# Patient Record
Sex: MALE | Race: BLACK OR AFRICAN AMERICAN | NOT HISPANIC OR LATINO | URBAN - METROPOLITAN AREA
[De-identification: names, ages, dates, MRNs, and addresses within clinical notes are randomized per-mention and may not be internally consistent; named-entity substitution may affect disease eponyms.]

---

## 2017-10-06 ENCOUNTER — INPATIENT (INPATIENT)
Facility: HOSPITAL | Age: 22
LOS: 3 days | Discharge: HOME | End: 2017-10-10
Attending: PSYCHIATRY & NEUROLOGY | Admitting: PSYCHIATRY & NEUROLOGY
Payer: COMMERCIAL

## 2017-10-06 DIAGNOSIS — F32.9 MAJOR DEPRESSIVE DISORDER, SINGLE EPISODE, UNSPECIFIED: ICD-10-CM

## 2017-10-07 VITALS — HEIGHT: 75 IN | WEIGHT: 249.78 LBS | TEMPERATURE: 98 F

## 2017-10-07 RX ORDER — DIPHENHYDRAMINE HCL 50 MG
50 CAPSULE ORAL EVERY 6 HOURS
Qty: 0 | Refills: 0 | Status: DISCONTINUED | OUTPATIENT
Start: 2017-10-07 | End: 2017-10-10

## 2017-10-07 RX ORDER — RISPERIDONE 4 MG/1
2 TABLET ORAL AT BEDTIME
Qty: 0 | Refills: 0 | Status: DISCONTINUED | OUTPATIENT
Start: 2017-10-07 | End: 2017-10-09

## 2017-10-07 RX ORDER — BENZTROPINE MESYLATE 1 MG
1 TABLET ORAL AT BEDTIME
Qty: 0 | Refills: 0 | Status: DISCONTINUED | OUTPATIENT
Start: 2017-10-07 | End: 2017-10-10

## 2017-10-07 RX ORDER — NICOTINE POLACRILEX 2 MG
4 GUM BUCCAL
Qty: 0 | Refills: 0 | Status: DISCONTINUED | OUTPATIENT
Start: 2017-10-07 | End: 2017-10-10

## 2017-10-07 RX ORDER — HALOPERIDOL DECANOATE 100 MG/ML
5 INJECTION INTRAMUSCULAR ONCE
Qty: 0 | Refills: 0 | Status: DISCONTINUED | OUTPATIENT
Start: 2017-10-07 | End: 2017-10-10

## 2017-10-07 RX ORDER — HALOPERIDOL DECANOATE 100 MG/ML
5 INJECTION INTRAMUSCULAR EVERY 6 HOURS
Qty: 0 | Refills: 0 | Status: DISCONTINUED | OUTPATIENT
Start: 2017-10-07 | End: 2017-10-10

## 2017-10-07 RX ADMIN — Medication 2 MILLIGRAM(S): at 23:39

## 2017-10-07 RX ADMIN — Medication 4 MILLIGRAM(S): at 17:11

## 2017-10-07 RX ADMIN — RISPERIDONE 2 MILLIGRAM(S): 4 TABLET ORAL at 20:55

## 2017-10-07 RX ADMIN — Medication 4 MILLIGRAM(S): at 13:55

## 2017-10-07 RX ADMIN — Medication 1 MILLIGRAM(S): at 20:55

## 2017-10-07 NOTE — CHART NOTE - NSCHARTNOTEFT_GEN_A_CORE
Patient Admitted from: [facility]     [facility] admitting diagnosis: Major depressive disorder with single episode    PAST MEDICAL & SURGICAL HISTORY:        Allergies    No Known Allergies    Intolerances    Depakote (Hepatoxicity)  fluphenazine (Dystonic RXN)      Social History:     FAMILY HISTORY:      MEDICATIONS  (STANDING):  benztropine 1 milliGRAM(s) Oral at bedtime  risperiDONE   Tablet 2 milliGRAM(s) Oral at bedtime    MEDICATIONS  (PRN):  diphenhydrAMINE   Capsule 50 milliGRAM(s) Oral every 6 hours PRN Agitation  diphenhydrAMINE   Injectable 50 milliGRAM(s) IntraMuscular every 6 hours PRN Agitation  haloperidol     Tablet 5 milliGRAM(s) Oral every 6 hours PRN Agitation  haloperidol    Injectable 5 milliGRAM(s) IntraMuscular once PRN Agitation  LORazepam     Tablet 2 milliGRAM(s) Oral every 6 hours PRN Agitation/anxiety  LORazepam     Tablet 2 milliGRAM(s) Oral every 2 hours PRN CIWA score increase by 2 points and current CIWA score GREATER THAN 9  LORazepam   Injectable 2 milliGRAM(s) IntraMuscular once PRN Agitation  nicotine  Polacrilex Gum 4 milliGRAM(s) Oral every 2 hours PRN tobacco cravings        CAPILLARY BLOOD GLUCOSE        I&O's Summary      PHYSICAL EXAM:  GENERAL: NAD, well-developed  HEAD:  Atraumatic, Normocephalic  EYES: EOMI, PERRLA, conjunctiva and sclera clear  NECK: Supple  CHEST/LUNG: Clear to auscultation bilaterally  HEART: Regular rate and rhythm  ABDOMEN: Soft, Nontender, Nondistended; Bowel sounds present and normal   EXTREMITIES:  2+ Peripheral Pulses, No clubbing, cyanosis, or edema  PSYCH: AAOx3  NEUROLOGY: non-focal  SKIN: intact     Vital Signs Last 24 Hrs  T(C): 37.1 (07 Oct 2017 06:52), Max: 37.1 (07 Oct 2017 06:52)  T(F): 98.7 (07 Oct 2017 06:52), Max: 98.7 (07 Oct 2017 06:52)  HR: -- 72  BP: -- 114/68  BP(mean): --  RR: 16 (07 Oct 2017 06:52) (16 - 16)  SpO2: --    LABS:                    RADIOLOGY & ADDITIONAL TESTS:    Assessment and Plan: 22 M with a PMH of asthma per the patient and a psychiatric h/o Major depressive disorder with single episode. The patient states that he has not had an asthma exacerbation for 3-4 years. He currently denies any medical complaints or symptoms such as headache, SOB, chest pain, abdominal pain, n/v/d/c, etc. His screening was unremarkable.   1. Major depressive disorder with single episode: Continue treatment as per primary psychiatry team: Benztropine and risperidone standing; Diphenhydramine, haloperidol, lorazepam PRN

## 2017-10-08 RX ORDER — NICOTINE POLACRILEX 2 MG
1 GUM BUCCAL DAILY
Qty: 0 | Refills: 0 | Status: DISCONTINUED | OUTPATIENT
Start: 2017-10-08 | End: 2017-10-10

## 2017-10-08 RX ADMIN — Medication 4 MILLIGRAM(S): at 19:53

## 2017-10-08 RX ADMIN — Medication 1 PATCH: at 15:25

## 2017-10-08 RX ADMIN — Medication 50 MILLIGRAM(S): at 22:55

## 2017-10-08 RX ADMIN — RISPERIDONE 2 MILLIGRAM(S): 4 TABLET ORAL at 20:30

## 2017-10-08 RX ADMIN — Medication 1 MILLIGRAM(S): at 20:30

## 2017-10-08 RX ADMIN — Medication 4 MILLIGRAM(S): at 17:25

## 2017-10-08 RX ADMIN — Medication 4 MILLIGRAM(S): at 13:52

## 2017-10-09 PROCEDURE — 99232 SBSQ HOSP IP/OBS MODERATE 35: CPT

## 2017-10-09 RX ORDER — PALIPERIDONE 1.5 MG/1
117 TABLET, EXTENDED RELEASE ORAL
Qty: 0 | Refills: 0 | COMMUNITY

## 2017-10-09 RX ORDER — BENZTROPINE MESYLATE 1 MG
1 TABLET ORAL
Qty: 30 | Refills: 0 | OUTPATIENT
Start: 2017-10-09 | End: 2017-11-08

## 2017-10-09 RX ADMIN — Medication 1 PATCH: at 09:36

## 2017-10-09 RX ADMIN — Medication 4 MILLIGRAM(S): at 22:06

## 2017-10-09 RX ADMIN — Medication 4 MILLIGRAM(S): at 08:36

## 2017-10-09 RX ADMIN — Medication 1 MILLIGRAM(S): at 22:06

## 2017-10-10 VITALS — TEMPERATURE: 98 F

## 2017-10-10 PROCEDURE — 99238 HOSP IP/OBS DSCHRG MGMT 30/<: CPT

## 2017-10-10 RX ADMIN — Medication 1 PATCH: at 09:26

## 2017-10-18 ENCOUNTER — OUTPATIENT (OUTPATIENT)
Dept: OUTPATIENT SERVICES | Facility: HOSPITAL | Age: 22
LOS: 1 days | Discharge: STILL ADMITTED OR ADMINISTRATIVE | End: 2017-10-18

## 2017-10-19 DIAGNOSIS — F20.9 SCHIZOPHRENIA, UNSPECIFIED: ICD-10-CM

## 2019-08-13 PROBLEM — Z00.00 ENCOUNTER FOR PREVENTIVE HEALTH EXAMINATION: Status: ACTIVE | Noted: 2019-08-13

## 2019-08-20 ENCOUNTER — APPOINTMENT (OUTPATIENT)
Dept: FAMILY MEDICINE | Facility: CLINIC | Age: 24
End: 2019-08-20

## 2021-01-16 ENCOUNTER — EMERGENCY (EMERGENCY)
Facility: HOSPITAL | Age: 26
LOS: 1 days | Discharge: HOME | End: 2021-01-16
Attending: EMERGENCY MEDICINE | Admitting: EMERGENCY MEDICINE
Payer: COMMERCIAL

## 2021-01-16 VITALS
HEART RATE: 80 BPM | OXYGEN SATURATION: 95 % | TEMPERATURE: 99 F | RESPIRATION RATE: 17 BRPM | SYSTOLIC BLOOD PRESSURE: 118 MMHG | DIASTOLIC BLOOD PRESSURE: 81 MMHG

## 2021-01-16 VITALS
HEART RATE: 88 BPM | OXYGEN SATURATION: 96 % | WEIGHT: 289.91 LBS | SYSTOLIC BLOOD PRESSURE: 133 MMHG | DIASTOLIC BLOOD PRESSURE: 77 MMHG | RESPIRATION RATE: 20 BRPM | HEIGHT: 75 IN | TEMPERATURE: 98 F

## 2021-01-16 DIAGNOSIS — Z20.822 CONTACT WITH AND (SUSPECTED) EXPOSURE TO COVID-19: ICD-10-CM

## 2021-01-16 DIAGNOSIS — R06.2 WHEEZING: ICD-10-CM

## 2021-01-16 DIAGNOSIS — J45.901 UNSPECIFIED ASTHMA WITH (ACUTE) EXACERBATION: ICD-10-CM

## 2021-01-16 LAB
ALBUMIN SERPL ELPH-MCNC: 4.3 G/DL — SIGNIFICANT CHANGE UP (ref 3.3–5)
ALP SERPL-CCNC: 84 U/L — SIGNIFICANT CHANGE UP (ref 40–120)
ALT FLD-CCNC: 25 U/L — SIGNIFICANT CHANGE UP (ref 10–45)
ANION GAP SERPL CALC-SCNC: 12 MMOL/L — SIGNIFICANT CHANGE UP (ref 5–17)
AST SERPL-CCNC: 26 U/L — SIGNIFICANT CHANGE UP (ref 10–40)
BASOPHILS # BLD AUTO: 0.04 K/UL — SIGNIFICANT CHANGE UP (ref 0–0.2)
BASOPHILS NFR BLD AUTO: 0.4 % — SIGNIFICANT CHANGE UP (ref 0–2)
BILIRUB SERPL-MCNC: 0.2 MG/DL — SIGNIFICANT CHANGE UP (ref 0.2–1.2)
BUN SERPL-MCNC: 15 MG/DL — SIGNIFICANT CHANGE UP (ref 7–23)
CALCIUM SERPL-MCNC: 9.6 MG/DL — SIGNIFICANT CHANGE UP (ref 8.4–10.5)
CHLORIDE SERPL-SCNC: 102 MMOL/L — SIGNIFICANT CHANGE UP (ref 96–108)
CK MB CFR SERPL CALC: 4.5 NG/ML — SIGNIFICANT CHANGE UP (ref 0–6.7)
CK SERPL-CCNC: 273 U/L — HIGH (ref 30–200)
CO2 SERPL-SCNC: 27 MMOL/L — SIGNIFICANT CHANGE UP (ref 22–31)
CREAT SERPL-MCNC: 0.77 MG/DL — SIGNIFICANT CHANGE UP (ref 0.5–1.3)
CRP SERPL-MCNC: 0.65 MG/DL — HIGH (ref 0–0.4)
D DIMER BLD IA.RAPID-MCNC: <150 NG/ML DDU — SIGNIFICANT CHANGE UP
EOSINOPHIL # BLD AUTO: 0.29 K/UL — SIGNIFICANT CHANGE UP (ref 0–0.5)
EOSINOPHIL NFR BLD AUTO: 3 % — SIGNIFICANT CHANGE UP (ref 0–6)
FERRITIN SERPL-MCNC: 92 NG/ML — SIGNIFICANT CHANGE UP (ref 30–400)
GLUCOSE SERPL-MCNC: 106 MG/DL — HIGH (ref 70–99)
HCT VFR BLD CALC: 39.1 % — SIGNIFICANT CHANGE UP (ref 39–50)
HGB BLD-MCNC: 12.3 G/DL — LOW (ref 13–17)
IMM GRANULOCYTES NFR BLD AUTO: 0.3 % — SIGNIFICANT CHANGE UP (ref 0–1.5)
LIDOCAIN IGE QN: 11 U/L — SIGNIFICANT CHANGE UP (ref 7–60)
LYMPHOCYTES # BLD AUTO: 3.31 K/UL — HIGH (ref 1–3.3)
LYMPHOCYTES # BLD AUTO: 33.9 % — SIGNIFICANT CHANGE UP (ref 13–44)
MCHC RBC-ENTMCNC: 27.8 PG — SIGNIFICANT CHANGE UP (ref 27–34)
MCHC RBC-ENTMCNC: 31.5 GM/DL — LOW (ref 32–36)
MCV RBC AUTO: 88.3 FL — SIGNIFICANT CHANGE UP (ref 80–100)
MONOCYTES # BLD AUTO: 0.56 K/UL — SIGNIFICANT CHANGE UP (ref 0–0.9)
MONOCYTES NFR BLD AUTO: 5.7 % — SIGNIFICANT CHANGE UP (ref 2–14)
NEUTROPHILS # BLD AUTO: 5.52 K/UL — SIGNIFICANT CHANGE UP (ref 1.8–7.4)
NEUTROPHILS NFR BLD AUTO: 56.7 % — SIGNIFICANT CHANGE UP (ref 43–77)
NRBC # BLD: 0 /100 WBCS — SIGNIFICANT CHANGE UP (ref 0–0)
PLATELET # BLD AUTO: 245 K/UL — SIGNIFICANT CHANGE UP (ref 150–400)
POTASSIUM SERPL-MCNC: 4.4 MMOL/L — SIGNIFICANT CHANGE UP (ref 3.5–5.3)
POTASSIUM SERPL-SCNC: 4.4 MMOL/L — SIGNIFICANT CHANGE UP (ref 3.5–5.3)
PROT SERPL-MCNC: 7.7 G/DL — SIGNIFICANT CHANGE UP (ref 6–8.3)
RBC # BLD: 4.43 M/UL — SIGNIFICANT CHANGE UP (ref 4.2–5.8)
RBC # FLD: 14.2 % — SIGNIFICANT CHANGE UP (ref 10.3–14.5)
SARS-COV-2 RNA SPEC QL NAA+PROBE: SIGNIFICANT CHANGE UP
SODIUM SERPL-SCNC: 141 MMOL/L — SIGNIFICANT CHANGE UP (ref 135–145)
TROPONIN T SERPL-MCNC: <0.01 NG/ML — SIGNIFICANT CHANGE UP (ref 0–0.01)
WBC # BLD: 9.75 K/UL — SIGNIFICANT CHANGE UP (ref 3.8–10.5)
WBC # FLD AUTO: 9.75 K/UL — SIGNIFICANT CHANGE UP (ref 3.8–10.5)

## 2021-01-16 PROCEDURE — 86140 C-REACTIVE PROTEIN: CPT

## 2021-01-16 PROCEDURE — 71046 X-RAY EXAM CHEST 2 VIEWS: CPT | Mod: 26

## 2021-01-16 PROCEDURE — 82553 CREATINE MB FRACTION: CPT

## 2021-01-16 PROCEDURE — 96374 THER/PROPH/DIAG INJ IV PUSH: CPT

## 2021-01-16 PROCEDURE — 85025 COMPLETE CBC W/AUTO DIFF WBC: CPT

## 2021-01-16 PROCEDURE — 85379 FIBRIN DEGRADATION QUANT: CPT

## 2021-01-16 PROCEDURE — 99285 EMERGENCY DEPT VISIT HI MDM: CPT | Mod: 25

## 2021-01-16 PROCEDURE — 99284 EMERGENCY DEPT VISIT MOD MDM: CPT | Mod: 25

## 2021-01-16 PROCEDURE — 84484 ASSAY OF TROPONIN QUANT: CPT

## 2021-01-16 PROCEDURE — 93005 ELECTROCARDIOGRAM TRACING: CPT

## 2021-01-16 PROCEDURE — U0005: CPT

## 2021-01-16 PROCEDURE — 94640 AIRWAY INHALATION TREATMENT: CPT

## 2021-01-16 PROCEDURE — 83690 ASSAY OF LIPASE: CPT

## 2021-01-16 PROCEDURE — 71046 X-RAY EXAM CHEST 2 VIEWS: CPT

## 2021-01-16 PROCEDURE — 82550 ASSAY OF CK (CPK): CPT

## 2021-01-16 PROCEDURE — 93010 ELECTROCARDIOGRAM REPORT: CPT

## 2021-01-16 PROCEDURE — 80053 COMPREHEN METABOLIC PANEL: CPT

## 2021-01-16 PROCEDURE — 82728 ASSAY OF FERRITIN: CPT

## 2021-01-16 PROCEDURE — 36415 COLL VENOUS BLD VENIPUNCTURE: CPT

## 2021-01-16 PROCEDURE — U0003: CPT

## 2021-01-16 RX ORDER — IPRATROPIUM/ALBUTEROL SULFATE 18-103MCG
3 AEROSOL WITH ADAPTER (GRAM) INHALATION ONCE
Refills: 0 | Status: COMPLETED | OUTPATIENT
Start: 2021-01-16 | End: 2021-01-16

## 2021-01-16 RX ORDER — ALBUTEROL 90 UG/1
2 AEROSOL, METERED ORAL
Qty: 1 | Refills: 0
Start: 2021-01-16 | End: 2021-01-25

## 2021-01-16 RX ORDER — SIMETHICONE 80 MG/1
1 TABLET, CHEWABLE ORAL
Qty: 9 | Refills: 0
Start: 2021-01-16 | End: 2021-01-18

## 2021-01-16 RX ORDER — ONDANSETRON 8 MG/1
1 TABLET, FILM COATED ORAL
Qty: 6 | Refills: 0
Start: 2021-01-16 | End: 2021-01-17

## 2021-01-16 RX ADMIN — Medication 3 MILLILITER(S): at 17:40

## 2021-01-16 RX ADMIN — Medication 3 MILLILITER(S): at 17:32

## 2021-01-16 RX ADMIN — Medication 125 MILLIGRAM(S): at 17:41

## 2021-01-16 NOTE — ED ADULT NURSE REASSESSMENT NOTE - NS ED NURSE REASSESS COMMENT FT1
Pt continuing to take off nebulizer treatment mask despite continuous education on importance of nebulizer treatment. Pt continuing to talk on phone. Speaking in full complete clear sentences, with no signs of acute distress.

## 2021-01-16 NOTE — ED PROVIDER NOTE - OBJECTIVE STATEMENT
24 yo male with hx asthma, no prior intubations ,states that he developed wheezing and  dry cough in the past 1-2 days assoc with nausea and vomiting (NBNB)- 2 episodes this am  no gross fevers or chills  no hx of Covid or known exposures  also ran out of his inhaler 1 day ago-

## 2021-01-16 NOTE — ED ADULT NURSE NOTE - OBJECTIVE STATEMENT
25y male reporting chest tightness, sob and one episodes of vomiting "That I think had red in it" this morning. Pt poor historian and dose not recall normal medications. Per pt "I need to get a new inhaler because I do not have one". Pt speaking in complete sentences and airway is clear. Denies cp, fever, chills, cough. Pt reports having close contact with Covid positive person. Denies recent testing. Pt reports "vaping" daily

## 2021-01-16 NOTE — ED PROVIDER NOTE - PATIENT PORTAL LINK FT
You can access the FollowMyHealth Patient Portal offered by [facility] by registering at the following website: [email or web address]. By joining [employer]’s FollowMyHealth portal, you will also be able to view your health information using other applications (apps) compatible with our system.

## 2021-01-16 NOTE — ED PROVIDER NOTE - NSFOLLOWUPCLINICS_GEN_ALL_ED_FT
[facility]  Family Medicine  [address]  [place], [place] [address]  Phone: [phone]  Fax:   Follow Up Time: 4-6 Days

## 2021-01-16 NOTE — ED PROVIDER NOTE - CLINICAL SUMMARY MEDICAL DECISION MAKING FREE TEXT BOX
asthma exac  pk flow 2500 now 400-450  may dc on oral pred and proventil  CXR neg  covid pending  low susp  d dimer neg  trop neg 24 yo male with asthma exac-  pk flow 250 now 400-450 --much improved with steroids and nebs CXR neg- may dc on pred and proventil  CXR neg - covid pending-  low susp  for PE-- d dimer neg / trop neg

## 2021-01-16 NOTE — ED ADULT NURSE NOTE - NSIMPLEMENTINTERV_GEN_ALL_ED
Implemented All Universal Safety Interventions:  [place] to call system. Call bell, personal items and telephone within reach. Instruct patient to call for assistance. Room bathroom lighting operational. Non-slip footwear when patient is off stretcher. Physically safe environment: no spills, clutter or unnecessary equipment. Stretcher in lowest position, wheels locked, appropriate side rails in place.

## 2021-01-16 NOTE — ED ADULT TRIAGE NOTE - CHIEF COMPLAINT QUOTE
24 y/o male with hx of asthma came in c/o chest tightness today with nausea and vomiting. Pt reports he ran out of his inhaler.

## 2021-04-11 VITALS
RESPIRATION RATE: 20 BRPM | DIASTOLIC BLOOD PRESSURE: 65 MMHG | OXYGEN SATURATION: 97 % | SYSTOLIC BLOOD PRESSURE: 112 MMHG

## 2021-04-12 ENCOUNTER — EMERGENCY (EMERGENCY)
Facility: HOSPITAL | Age: 26
LOS: 1 days | Discharge: HOME | End: 2021-04-12
Admitting: EMERGENCY MEDICINE
Payer: MEDICAID

## 2021-04-12 VITALS
WEIGHT: 299.39 LBS | SYSTOLIC BLOOD PRESSURE: 127 MMHG | HEART RATE: 115 BPM | HEIGHT: 75 IN | OXYGEN SATURATION: 96 % | RESPIRATION RATE: 18 BRPM | TEMPERATURE: 98 F | DIASTOLIC BLOOD PRESSURE: 63 MMHG

## 2021-04-12 PROCEDURE — 73610 X-RAY EXAM OF ANKLE: CPT

## 2021-04-12 PROCEDURE — 99283 EMERGENCY DEPT VISIT LOW MDM: CPT

## 2021-04-12 PROCEDURE — 73610 X-RAY EXAM OF ANKLE: CPT | Mod: 26,LT

## 2021-04-12 PROCEDURE — 99284 EMERGENCY DEPT VISIT MOD MDM: CPT | Mod: 25

## 2021-04-12 PROCEDURE — 73630 X-RAY EXAM OF FOOT: CPT | Mod: 26,LT

## 2021-04-12 PROCEDURE — 73630 X-RAY EXAM OF FOOT: CPT

## 2021-04-12 PROCEDURE — 99053 MED SERV 10PM-8AM 24 HR FAC: CPT

## 2021-04-12 RX ORDER — IBUPROFEN 200 MG
800 TABLET ORAL ONCE
Refills: 0 | Status: COMPLETED | OUTPATIENT
Start: 2021-04-12 | End: 2021-04-12

## 2021-04-12 RX ADMIN — Medication 800 MILLIGRAM(S): at 02:04

## 2021-04-12 NOTE — ED ADULT NURSE NOTE - NSIMPLEMENTINTERV_GEN_ALL_ED
Implemented All Universal Safety Interventions:  [place] to call system. Call bell, personal items and telephone within reach. Instruct patient to call for assistance. Room bathroom lighting operational. Non-slip footwear when patient is off stretcher. Physically safe environment: no spills, clutter or unnecessary equipment. Stretcher in lowest position, wheels locked, appropriate side rails in place.

## 2021-04-12 NOTE — ED PROVIDER NOTE - PATIENT PORTAL LINK FT
You can access the FollowMyHealth Patient Portal offered by [facility] by registering at the following website: [email or web address]. By joining [employer]’s FollowMyHealth portal, you will also be able to view your health information using other applications (apps) compatible with our system.

## 2021-04-12 NOTE — ED ADULT NURSE NOTE - OBJECTIVE STATEMENT
Per pt c/o twisting left ankle yesterday and states inability to bear weight on left foot.  Pt states he able to wiggle toes alittle but same activity is painful.  (+) PMS noted to left foot and some swelling.  Pt to xray and prescribed pain medicine administered. All procedures explained to pt and same verbalized understanding to same satisfactorily.

## 2021-04-12 NOTE — ED ADULT TRIAGE NOTE - CHIEF COMPLAINT QUOTE
L ankle pain with intermittent numbness/tingling to heel. Twisted ankle yesterday. Reports difficulty walking s/t pain.

## 2021-04-12 NOTE — ED PROVIDER NOTE - CLINICAL SUMMARY MEDICAL DECISION MAKING FREE TEXT BOX
pt c/o ankle/foot pain x 1 d s/p twisting it, able to walk, neurovascular intact, no deformity or dislocation, given ibuprofen, xrays pt c/o ankle/foot pain x 1 d s/p twisting it, able to walk, neurovascular intact, no deformity or dislocation, given ibuprofen, xrays neg for acute fx, ace wrap for ambulatory comfort, to RICE, prn ibuprofen, f/u w/ortho

## 2021-04-12 NOTE — ED PROVIDER NOTE - MUSCULOSKELETAL, MLM
Spine appears normal, range of motion is not limited, no muscle or joint tenderness; L LE: No knee tend, no tib/fib tend, achilles tendon intact, no tend over medial or lateral malleolus, + tend over calcaneous, + diffuse tend over top of foot, pedal pulse 2+, FROM, able to weight bare

## 2021-04-12 NOTE — ED PROVIDER NOTE - OBJECTIVE STATEMENT
The pt is a 24 y/o M, who presents to ED c/o L ankle and foot pain x 1 d. Pt states that he twisted it yest, pain to heel, pain w/walking, 7/10, took a tyl w/o relief yest, no pain meds taken today. Denies knee or leg pain, swelling, discolorations, breaks in skin, any other injuries

## 2021-04-12 NOTE — ED PROVIDER NOTE - NSFOLLOWUPINSTRUCTIONS_ED_ALL_ED_FT
REST, ICE, ELEVATE, USE ACE WRAP, TAKE IBUPROFEN, FOLLOW UP WITH ORTHOPEDICS  R.I.C.E. Treatment  WHAT YOU NEED TO KNOW:  R.I.C.E. treatment is a 4-step process used to decrease swelling and pain caused by an injury. R.I.C.E. stands for rest, ice, compression, and elevation. R.I.C.E. should be done within 24 to 48 hours after an injury.  Ice and Elevation  DISCHARGE INSTRUCTIONS:  Return to the emergency department if:   •Your pain is severe.  •You have severe swelling or deformity.  •You have numbness in the injured area.  Contact your healthcare provider if:   •Your pain and swelling does not go away after a few days.  •You have questions or concerns about your condition or care.  How to use R.I.C.E. treatment:   •Rest your injured area as directed. You may need to stop using, or keep weight off, the injury for 48 hours or longer. Your healthcare provider may recommend crutches or another device. Return to your usual activities as directed.   •Apply ice on your injured area for 15 to 20 minutes every 4 hours or as directed. Use an ice pack, or put crushed ice in a plastic bag. Cover it with a towel. Ice helps prevent tissue damage and decreases swelling and pain.  •Compress, or keep pressure on, the injured area. Compression will help decrease swelling and support the injured area. Use an elastic bandage, air stirrup, splint, or sling as directed. If you use an elastic bandage to wrap your injured area, make sure the bandage is not too tight.       •Elevate the injured area above the level of your heart as often as you can. This will help decrease swelling and pain. Prop the injured area on pillows or blankets to keep it elevated comfortably.
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
## 2021-04-14 DIAGNOSIS — S93.402A SPRAIN OF UNSPECIFIED LIGAMENT OF LEFT ANKLE, INITIAL ENCOUNTER: ICD-10-CM

## 2021-04-14 DIAGNOSIS — E66.3 OVERWEIGHT: ICD-10-CM

## 2021-04-14 DIAGNOSIS — Z79.899 OTHER LONG TERM (CURRENT) DRUG THERAPY: ICD-10-CM

## 2021-04-14 DIAGNOSIS — M25.572 PAIN IN LEFT ANKLE AND JOINTS OF LEFT FOOT: ICD-10-CM

## 2021-04-14 DIAGNOSIS — Z79.52 LONG TERM (CURRENT) USE OF SYSTEMIC STEROIDS: ICD-10-CM
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
